# Patient Record
Sex: MALE | Race: WHITE
[De-identification: names, ages, dates, MRNs, and addresses within clinical notes are randomized per-mention and may not be internally consistent; named-entity substitution may affect disease eponyms.]

---

## 2019-12-04 ENCOUNTER — HOSPITAL ENCOUNTER (EMERGENCY)
Dept: HOSPITAL 62 - ER | Age: 29
Discharge: HOME | End: 2019-12-04
Payer: MEDICAID

## 2019-12-04 VITALS — DIASTOLIC BLOOD PRESSURE: 81 MMHG | SYSTOLIC BLOOD PRESSURE: 115 MMHG

## 2019-12-04 DIAGNOSIS — F12.10: ICD-10-CM

## 2019-12-04 DIAGNOSIS — J45.909: ICD-10-CM

## 2019-12-04 DIAGNOSIS — K40.90: Primary | ICD-10-CM

## 2019-12-04 DIAGNOSIS — R31.9: ICD-10-CM

## 2019-12-04 LAB
APPEARANCE UR: CLEAR
APTT PPP: YELLOW S
BILIRUB UR QL STRIP: NEGATIVE
GLUCOSE UR STRIP-MCNC: NEGATIVE MG/DL
KETONES UR STRIP-MCNC: NEGATIVE MG/DL
NITRITE UR QL STRIP: NEGATIVE
PH UR STRIP: 5 [PH] (ref 5–9)
PROT UR STRIP-MCNC: NEGATIVE MG/DL
SP GR UR STRIP: 1.03
UROBILINOGEN UR-MCNC: NEGATIVE MG/DL (ref ?–2)

## 2019-12-04 PROCEDURE — 81001 URINALYSIS AUTO W/SCOPE: CPT

## 2019-12-04 PROCEDURE — 76870 US EXAM SCROTUM: CPT

## 2019-12-04 PROCEDURE — 93976 VASCULAR STUDY: CPT

## 2019-12-04 PROCEDURE — 99284 EMERGENCY DEPT VISIT MOD MDM: CPT

## 2019-12-04 NOTE — ER DOCUMENT REPORT
HPI





- HPI


Patient complains to provider of: inguinal hernia


Time Seen by Provider: 12/04/19 14:25


Onset: Other - 10 yearss


Quality of pain: Achy


Pain Level: 5


Context: 





This 29-year-old male presents emergency department with right-sided inguinal 

hernia for the past 10 years that has increased.  Reports he has been unable to 

work for the last 4 days due to this.


Denies pain with void.


Associated Symptoms: None


Exacerbated by: Other


Relieved by: Denies


Similar symptoms previously: No


Recently seen / treated by doctor: No





Past Medical History





- General


Information source: Patient





- Social History


Smoking Status: Never Smoker


Chew tobacco use (# tins/day): No


Frequency of alcohol use: Rare


Drug Abuse: Marijuana


Family History: Reviewed & Not Pertinent


Patient has suicidal ideation: No


Patient has homicidal ideation: No


Pulmonary Medical History: Reports: Hx Asthma





- Immunizations


Immunizations up to date: Yes


Hx Diphtheria, Pertussis, Tetanus Vaccination: Yes - 2011





Vertical Provider Document





- CONSTITUTIONAL


Agree With Documented VS: Yes


Exam Limitations: No Limitations


General Appearance: WD/WN, No Apparent Distress





- INFECTION CONTROL


TRAVEL OUTSIDE OF THE [place] IN LAST 30 DAYS: No





- HEENT


HEENT: Atraumatic, Normocephalic





- NECK


Neck: Normal Inspection, Supple.  negative: Lymphadenopathy-Left, 

Lymphadenopathy-Right





- RESPIRATORY


Respiratory: Breath Sounds Normal, No Respiratory Distress





- CARDIOVASCULAR


Cardiovascular: Regular Rate





- GI/ABDOMEN


Gastrointestinal: Abdomen Soft, Abdomen Non-Tender





- REPRODUCTIVE


Male Genitalia: Normal Inspection


Notes: 





soft bulge to right inguinal area, no firmness, reduced with pressure





- MUSCULOSKELETAL/EXTREMETIES


Musculoskeletal/Extremeties: MAEW, FROM





- NEURO


Level of Consciousness: Awake, Alert, Appropriate





- DERM


Integumentary: Warm, Dry





Course





- Re-evaluation


Re-evalutation: 





12/04/19 16:40


This 29-year-old male presents with complaints of inguinal hernia for the past 

10 years increasing.  Reports he lost his job because he cannot work because 

every time he lifts something it hurts.  Ultrasound report notes right inguinal 

hernia area soft very tender to touch no erythema no warmth.  Dr. Cervantes surgeon

called he is in the OR will call me back.


12/04/19 17:10


Dr. Cervantes called back.  Discussed ultrasound report and patient's symptoms.  He

reports patient may follow-up with him in the office.  Patient was instructed 

this.











Dictation of this chart was performed using voice recognition software; 

therefore, there may be some unintended grammatical errors.


                                        











Urine Color  YELLOW   12/04/19  14:36    


 


Urine Appearance  CLEAR   12/04/19  14:36    


 


Urine pH  5.0  (5.0-9.0)   12/04/19  14:36    


 


Ur Specific Gravity  1.027   12/04/19  14:36    


 


Urine Protein  NEGATIVE mg/dL (NEGATIVE)   12/04/19  14:36    


 


Urine Glucose (UA)  NEGATIVE mg/dL (NEGATIVE)   12/04/19  14:36    


 


Urine Ketones  NEGATIVE mg/dL (NEGATIVE)   12/04/19  14:36    


 


Urine Blood  MODERATE  (NEGATIVE)  H  12/04/19  14:36    


 


Urine Nitrite  NEGATIVE  (NEGATIVE)   12/04/19  14:36    


 


Ur Leukocyte Esterase  NEGATIVE  (NEGATIVE)   12/04/19  14:36    


 


Urine WBC (Auto)  1 /HPF  12/04/19  14:36    


 


Urine RBC (Auto)  3 /HPF  12/04/19  14:36    








                                        





Scrotum Ultrasound  12/04/19 14:31


IMPRESSION:  1.  NO EVIDENCE OF TESTICULAR MASS OR TORSION.


2.  Right inguinal hernia is suggested.


 











12/04/19 19:21


Dictation of this chart was performed using voice recognition software; 

therefore, there may be some unintended grammatical errors.





- Vital Signs


Vital signs: 


                                        











Temp Pulse Resp BP Pulse Ox


 


 98.0 F   81   20   123/80   98 


 


 12/04/19 14:25  12/04/19 14:25  12/04/19 14:25  12/04/19 14:25  12/04/19 14:25














- Laboratory


Laboratory results interpreted by me: 


                                        











  12/04/19





  14:36


 


Urine Blood  MODERATE H














- Diagnostic Test


Radiology reviewed: Reports reviewed





Discharge





- Discharge


Clinical Impression: 


 Right inguinal hernia





Hematuria


Qualifiers:


 Hematuria type: unspecified type Qualified Code(s): R31.9 - Hematuria, 

unspecified





Condition: Stable


Disposition: HOME, SELF-CARE


Instructions:  Hematuria ([facility]), Hernia ([facility])


Additional Instructions: 


*You have been evaluated for inguinal hernia, hematuria


*take ibuprofen as indicated


*Follow up with Dr Cervantes, call for an appointment tomorrow


*Return to ED for worsening condition, changes, needs


Referrals: 


EMILY ALANIZ MD [Primary Care Provider] - Follow up as needed


TONEY CERVANTES MD [ACTIVE STAFF] - Follow up tomorrow (call for an 

appointment)

## 2019-12-04 NOTE — ER DOCUMENT REPORT
ED Medical Screen (RME)





- General


Chief Complaint: Abdominal Pain


Stated Complaint: GROIN PAIN


Time Seen by Provider: 12/04/19 14:25


Mode of Arrival: Ambulatory


Information source: Patient


Notes: 





This 29-year-old male presents emergency department with right-sided inguinal 

hernia for the past 10 years that has increased.  Reports he has been unable to 

work for the last 4 days due to this.


Denies pain with void.











I have greeted and performed a rapid initial assessment of this patient.  A 

comprehensive ED assessment and evaluation of the patient, analysis of test 

results and completion of the medical decision making process will be conducted 

by additional ED providers.  Dictation of this chart was performed using voice 

recognition software; therefore, there may be some unintended grammatical 

errors.


TRAVEL OUTSIDE OF THE [place] IN LAST 30 DAYS: No





- Related Data


Allergies/Adverse Reactions: 


                                        





ibuprofen [From Motrin] Allergy (Verified 06/03/16 09:07)


   











Past Medical History





- Social History


Chew tobacco use (# tins/day): No


Frequency of alcohol use: Rare


Drug Abuse: Marijuana


Pulmonary Medical History: Reports: Hx Asthma





- Immunizations


Immunizations up to date: Yes


Hx Diphtheria, Pertussis, Tetanus Vaccination: Yes - 2011





Physical Exam





- Vital signs


Vitals: 





                                        











Temp Pulse Resp BP Pulse Ox


 


 98.0 F   81   20   123/80   98 


 


 12/04/19 14:10  12/04/19 14:10  12/04/19 14:10  12/04/19 14:10  12/04/19 14:10














Course





- Vital Signs


Vital signs: 





                                        











Temp Pulse Resp BP Pulse Ox


 


 98.0 F   81   20   123/80   98 


 


 12/04/19 14:25  12/04/19 14:25  12/04/19 14:25  12/04/19 14:25  12/04/19 14:25

## 2019-12-04 NOTE — RADIOLOGY REPORT (SQ)
EXAM DESCRIPTION:  U/S SCROTUM W/DOPPLER



COMPLETED DATE/TIME:  12/4/2019 3:28 pm



REASON FOR STUDY:  inguinal hernia and testicular pain



COMPARISON:  None.



TECHNIQUE:  Static and realtime gray scale imaging of the scrotum and testes.  Selected color Doppler
 and spectral images recorded to document blood flow.



LIMITATIONS:  None.



FINDINGS:  RIGHT:

TESTICLE:  The right testicle measures 5.1 x 4.0 x 3.5 cm, normal size. Normal echotexture.  Normal b
lood flow.  No mass.

EPIDIDYMIS:  The head of the epididymis measures 7 x 11 x 8 mm.  Normal.

HYDROCELE OR VARICOCELE: No.

HERNIA OR EXTRA-TESTICULAR MASS: No.

OTHER:  An inguinal hernia is suggested which extends into the right scrotal sac measures 8.2 x 7.0 x
 4.5 cm and contains fat.  No other significant finding.

LEFT:

TESTICLE:  The left testicle measures 6.0 x 3.6 x 3.2 cm, normal size. Normal echotexture.  Normal bl
ood flow.  No mass.

EPIDIDYMIS:  The head of the epididymis measures 8 x 9 x 13 mm.

HYDROCELE OR VARICOCELE: No.

HERNIA OR EXTRA-TESTICULAR MASS: No.

OTHER: No other significant finding.



IMPRESSION:  1.  NO EVIDENCE OF TESTICULAR MASS OR TORSION.

2.  Right inguinal hernia is suggested.



TECHNICAL DOCUMENTATION:  JOB ID:  [id number]

 [employer]- All Rights Reserved



Reading location - IP/workstation name: [email or web address]

## 2019-12-05 ENCOUNTER — HOSPITAL ENCOUNTER (OUTPATIENT)
Dept: HOSPITAL 62 - OD | Age: 29
End: 2019-12-05
Attending: SURGERY
Payer: MEDICAID

## 2019-12-05 DIAGNOSIS — Z01.818: Primary | ICD-10-CM

## 2019-12-05 DIAGNOSIS — K40.20: ICD-10-CM

## 2019-12-05 LAB
ANION GAP SERPL CALC-SCNC: 11 MMOL/L (ref 5–19)
BUN SERPL-MCNC: 16 MG/DL (ref 7–20)
CALCIUM: 9.9 MG/DL (ref 8.4–10.2)
CHLORIDE SERPL-SCNC: 107 MMOL/L (ref 98–107)
CO2 SERPL-SCNC: 24 MMOL/L (ref 22–30)
GLUCOSE SERPL-MCNC: 81 MG/DL (ref 75–110)
POTASSIUM SERPL-SCNC: 4.3 MMOL/L (ref 3.6–5)

## 2019-12-05 PROCEDURE — 80048 BASIC METABOLIC PNL TOTAL CA: CPT

## 2019-12-05 PROCEDURE — 36415 COLL VENOUS BLD VENIPUNCTURE: CPT

## 2019-12-09 ENCOUNTER — HOSPITAL ENCOUNTER (OUTPATIENT)
Dept: HOSPITAL 62 - OROUT | Age: 29
Discharge: HOME | End: 2019-12-09
Attending: SURGERY
Payer: MEDICAID

## 2019-12-09 VITALS — DIASTOLIC BLOOD PRESSURE: 76 MMHG | SYSTOLIC BLOOD PRESSURE: 112 MMHG

## 2019-12-09 DIAGNOSIS — K40.30: Primary | ICD-10-CM

## 2019-12-09 DIAGNOSIS — K40.90: ICD-10-CM

## 2019-12-09 LAB
ANION GAP SERPL CALC-SCNC: 10 MMOL/L (ref 5–19)
BUN SERPL-MCNC: 14 MG/DL (ref 7–20)
CALCIUM: 9 MG/DL (ref 8.4–10.2)
CHLORIDE SERPL-SCNC: 106 MMOL/L (ref 98–107)
CO2 SERPL-SCNC: 24 MMOL/L (ref 22–30)
GLUCOSE SERPL-MCNC: 84 MG/DL (ref 75–110)
POTASSIUM SERPL-SCNC: 4.2 MMOL/L (ref 3.6–5)

## 2019-12-09 PROCEDURE — 86901 BLOOD TYPING SEROLOGIC RH(D): CPT

## 2019-12-09 PROCEDURE — 49650 LAP ING HERNIA REPAIR INIT: CPT

## 2019-12-09 PROCEDURE — 86900 BLOOD TYPING SEROLOGIC ABO: CPT

## 2019-12-09 PROCEDURE — 36415 COLL VENOUS BLD VENIPUNCTURE: CPT

## 2019-12-09 PROCEDURE — 86850 RBC ANTIBODY SCREEN: CPT

## 2019-12-09 PROCEDURE — 80048 BASIC METABOLIC PNL TOTAL CA: CPT

## 2019-12-09 PROCEDURE — C1758 CATHETER, URETERAL: HCPCS

## 2019-12-09 PROCEDURE — C1781 MESH (IMPLANTABLE): HCPCS

## 2019-12-10 NOTE — DISCHARGE SUMMARY
Discharge Summary (SDC)





- Discharge


Final Diagnosis: 





Bilateral inguinal hernias, right is incarcerated, left is reducible.


Date of Surgery: 12/09/19


Discharge Date: 12/09/19


Condition: Stable


Forms:  [facility] Anesthesia D/C Instruction, Discharge POC-Surgical Service


Treatment or Instructions: 


Bathing: You can shower in 48 hours. Remove your bandage before you shower. It 

is normal to see a small amount of blood under the bandage. Carefully wash 

around your wound. It is okay to let soap and water run over your wound. Do not 

scrub your wound. Gently pay your wound dry. 


Care for your wound as directed: If you have strips of medical tape over your 

incision, allow them to fall off on their own. It may take 7 to 10 days for them

to fall off. Do not put powders, lotions, or creams on your wound. They may 

cause your wound to get infected. Do not get in a bathtub, swimming pool, or hot

tub until your healthcare provider says it is okay. Check your wound every day 

for signs of infection, such as redness, swelling, or pus. Bruising is normal 

and expected. Men may have bruising and swelling in the scrotum. 


Take deep breaths and cough: Do this 10 times each hour. This will decrease your

risk for a lung infection. Take a deep breath and hold it for as long as you 

can. Let the air out and then cough strongly. Deep breaths help open your 

airway. You may be given an incentive spirometer to help you take deep breaths. 

Put the plastic piece in your mouth and take a slow, deep breath. Then let the 

air out and cough. Repeat these steps 10 times every hour. 


Use a pillow as a splint: Press a pillow lightly against your incision when you 

cough, move, or get out of bed. This may decrease pain or discomfort. You may 

need another person to help you get in and out of bed, a chair, or off the 

toilet. 


Activity: Get out of bed and walk the day after your surgery. This will help 

prevent blood clots, move your bowels after surgery, and increase healing. Start

with short walks around the house. Gradually walk further each day. Do not play 

sports for 2 to 3 weeks. Do not lift anything heavier than 5 pounds until your 

healthcare provider says it is okay. This may put too much pressure on your 

incision and cause it to come apart. It may also increase your risk for another 

hernia. 


Drink liquids as directed: Liquids may prevent constipation and straining during

a bowel movement. This will help prevent pressure on your incision, and prevent 

another hernia. Ask how much liquid to drink each day and which liquids are best

for you. 


Decrease swelling: 


* Apply ice on your incision for 15 to 20 minutes every hour or as directed. Use

  an ice pack, or put crushed ice in a plastic bag. Cover it with a towel. Ice 

  helps prevent tissue damage and decreases swelling and pain.





* Elevate your scrotum on a towel. Lie in bed. Roll a small towel and place it 

  under your scrotum. This will help decrease swelling and bruising. 


Driving: Do not drive for at least 1 week after surgery. Do not drive if you are

taking prescription pain medication. Do not drive until it is comfortable to 

wear a seatbelt across your abdomen. Ask your healthcare provider when it is 

safe for you to drive. 


Return to work or school: You may be able to return to work or school in 48 to 

72 hours. You may need to stay out of work if longer you have to lift heavy 

items at work. 


Do not smoke: Nicotine and other chemicals in cigarettes and cigars can prevent 

your wound from healing. It can also increase your risk for another inguinal 

hernia. Ask your healthcare provider for information if you currently smoke and 

need help to quit. E-cigarettes or smokeless tobacco still contain nicotine. 

Talk to your healthcare provider before you use these products. 


Referrals: 


TONEY RIVERA MD [ACTIVE STAFF] - 12/17/19 3:15 pm


GILDA LOWERY DO [Primary Care Provider] - 


Discharge Diet: As Tolerated


Respiratory Treatments at Home: Deep Breathing/Coughing, Incentive Spirometer


Discharge Activity: No Lifting Over 10 Pounds, No Lifting/Push/Pulling


Home Care Assistance: None Needed


Report the Following to Your Physician Immediately: Shortness of Breath, Nausea,

Vomiting, Increase in Pain, Fever over 101 Degrees, Unusual Bleeding, Redness, 

Warmth, Drainage-Yellow, Drainage-Green

## 2019-12-10 NOTE — OPERATIVE REPORT
Nonrecallable Operative Report


DATE OF SURGERY: 12/09/19


PREOPERATIVE DIAGNOSIS: Bilateral, symptomatic inguinal hernias.  Right is 

incarcerated, left is reducible.


POSTOPERATIVE DIAGNOSIS: Same as above


OPERATION: Bilateral robot-assisted laparoscopic inguinal hernia repair with 

mesh.


SURGEON: TONEY RIVERA


ANESTHESIA: GA


TISSUE REMOVED OR ALTERED: None


COMPLICATIONS: 





None apparent


ESTIMATED BLOOD LOSS: Minimal


PROCEDURE: 





Drains/implants: Right and left large 3 DMax inguinal hernia mesh.





Procedure in detail: After informed consent was obtained, the patient was 

brought into the operating room and laid in the supine position.  The area of 

the abdomen was prepped and draped in a normal sterile fashion.  A calderón

praumbilical incision was created with a 15 blade scalpel.  Dissection was 

carried down to the fascia using sharp and blunt dissection.  The linea alba 

fascia was incised sharply, the abdomen was entered sharply.  The balloon trocar

was inserted, and pneumoperitoneum was achieved.





2 right and left lateral 8 mm robotic trochars were then placed under direct 

laparoscopic visualization.  The robot was brought over the patient and docked 

appropriately.  I then assumed my position at the surgeon's console.  Attention 

was turned to the right groin.  There was a large indirect inguinal hernia 

defect with a large amount of omentum incarcerated within it.  With great 

difficulty the omentum was reduced into the abdominal cavity in its entirety.  

Next dissection of the sac was undertaken.  The peritoneum was scored 3 cm 

superior to the defect.  A preperitoneal dissection was undertaken using sharp 

dissection, blunt dissection, and electrocautery.  The hernia sac was then freed

from the cord structures, taking great care not to injure the cord structures.  

Once this was completed, a right-sided 3 DMax inguinal hernia mesh was placed 

into the preperitoneal space.  It was sutured medially and superiorly using 2-0 

Vicryl suture.  After this was completed, the peritoneal defect was closed using

2-0 V lock suture.  Attention was then turned to the left side.





The peritoneum was scored in the left groin, 3 cm superior to the small indirect

inguinal hernia defect.  A preperitoneal dissection was undertaken using sharp 

dissection, blunt dissection, and electrocautery.  The small hernia sac was 

freed from the cord structures, taking great care not to injure the cord 

structures.  There was a lipoma of the cord that was present.  This was reduced.

 Next, a large left-sided 3 DMax inguinal hernia mesh was placed into the 

preperitoneal space.  It was sutured medially and superiorly using 2-0 Vicryl 

suture.  The peritoneum was then closed using 2-0 V lock suture in simple 

running fashion.  Once this was completed, the repairs were inspected, and found

to be in good order.  I scrubbed back into the case.  The robot was then 

undocked, the trochars were removed, and pneumoperitoneum was relieved.





The supraumbilical fascia was closed using 0 Vicryl suture in figure-of-eight 

fashion.  The overlying skin was closed using 4-0 Vicryl Rapide suture in 

subcuticular fashion.  Dressings were placed, and the procedure was concluded.  

All sponge, instrument, and needle counts were correct x2.





Condition: Stable.

## 2020-08-12 ENCOUNTER — HOSPITAL ENCOUNTER (EMERGENCY)
Dept: HOSPITAL 62 - ER | Age: 30
Discharge: HOME | End: 2020-08-12
Payer: MEDICAID

## 2020-08-12 VITALS — SYSTOLIC BLOOD PRESSURE: 124 MMHG | DIASTOLIC BLOOD PRESSURE: 64 MMHG
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
## 2020-08-12 DIAGNOSIS — Z88.8: ICD-10-CM

## 2020-08-12 DIAGNOSIS — J45.909: ICD-10-CM

## 2020-08-12 DIAGNOSIS — N50.811: ICD-10-CM

## 2020-08-12 DIAGNOSIS — R10.31: Primary | ICD-10-CM

## 2020-08-12 LAB
APPEARANCE UR: CLEAR
APTT PPP: YELLOW S
BILIRUB UR QL STRIP: NEGATIVE
CHLAM PCR: NOT DETECTED
GLUCOSE UR STRIP-MCNC: NEGATIVE MG/DL
KETONES UR STRIP-MCNC: NEGATIVE MG/DL
NITRITE UR QL STRIP: NEGATIVE
PH UR STRIP: 6 [PH] (ref 5–9)
PROT UR STRIP-MCNC: NEGATIVE MG/DL
SP GR UR STRIP: 1.02
UROBILINOGEN UR-MCNC: 4 MG/DL (ref ?–2)

## 2020-08-12 PROCEDURE — 87491 CHLMYD TRACH DNA AMP PROBE: CPT

## 2020-08-12 PROCEDURE — 81001 URINALYSIS AUTO W/SCOPE: CPT

## 2020-08-12 PROCEDURE — 76857 US EXAM PELVIC LIMITED: CPT

## 2020-08-12 PROCEDURE — 99284 EMERGENCY DEPT VISIT MOD MDM: CPT

## 2020-08-12 PROCEDURE — 87591 N.GONORRHOEAE DNA AMP PROB: CPT

## 2020-08-12 NOTE — ER DOCUMENT REPORT
ED General





- General


Chief Complaint: Groin Pain


Stated Complaint: GROIN PAIN


Time Seen by Provider: 08/12/20 17:03


Primary Care Provider: 


GILDA LOWERY DO [Primary Care Provider] - Follow up as needed


Mode of Arrival: Ambulatory


TRAVEL OUTSIDE OF THE [place] IN LAST 30 DAYS: No





- HPI


Patient complains to provider of: groin pain


Notes: 





Patient presents with resolved episode of right groin pain right testicle pain 

pain was 10/10 sharp in nature without radiation nothing made it better or 

worse.  Patient says the pain started last night when he was moving some heavy 

equipment at work.  Patient says he been having some dull aching pain prior to 

that but he thinks it got worse after he moved a heavy equipment.  Patient has 

history of inguinal hernia repair in December.  Has been doing well but has some

continued nagging pain in the right side.  The episode last night but much 

worse.  Denies fever chills nausea vomiting other symptoms had normal bowel 

movement yesterday and today.





- Related Data


Allergies/Adverse Reactions: 


                                        





ibuprofen [From Motrin] Allergy (Verified 06/03/16 09:07)


   











Past Medical History





- General


Information source: Patient





- Social History


Smoking Status: Unknown if Ever Smoked


Family History: Reviewed & Not Pertinent





- Past Medical History


Cardiac Medical History: 


   Denies: Hx Coronary Artery Disease, Hx Heart Attack, Hx Hypertension


Pulmonary Medical History: Reports: Hx Asthma


   Denies: Hx Bronchitis, Hx COPD, Hx Pneumonia


Neurological Medical History: Denies: Hx Cerebrovascular Accident, Hx Seizures


Musculoskeletal Medical History: Denies Hx Arthritis





- Immunizations


Immunizations up to date: Yes


Hx Diphtheria, Pertussis, Tetanus Vaccination: Yes - 2011





Review of Systems





- Review of Systems


Notes: 





REVIEW OF SYSTEMS:


CONSTITUTIONAL: -fevers, -chills


EENT: -eye pain, -difficulty swallowing, -nasal congestion


CARDIOVASCULAR: -chest pain, -syncope.


RESPIRATORY: -cough, -SOB


GASTROINTESTINAL: Groin pain, -nausea, -vomiting, -diarrhea


GENITOURINARY: -dysuria, -hematuria


MUSCULOSKELETAL: -back pain, -neck pain


SKIN: -rash or skin lesions.


HEMATOLOGIC: -easy bruising or bleeding.


LYMPHATIC: -swollen, enlarged glands.


NEUROLOGICAL: -altered mental status or loss of consciousness, -headache, -

neurologic symptoms


PSYCHIATRIC: -anxiety, -depression.


ALL OTHER SYSTEMS REVIEWED AND NEGATIVE.





Physical Exam





- Vital signs


Vitals: 


                                        











Temp Pulse Resp BP Pulse Ox


 


 98.6 F   71   18   130/85 H  98 


 


 08/12/20 17:04  08/12/20 17:04  08/12/20 17:04  08/12/20 17:04  08/12/20 17:04














- Notes


Notes: 





PHYSICAL EXAMINATION:


GENERAL: Well-appearing, well-nourished and in no acute distress.


HEAD: Atraumatic, normocephalic.


EYES: Pupils equal round, sclera anicteric, conjunctiva are normal.


ENT: Surgical mask in place.


NECK: Normal range of motion, 


LUNGS: No respiratory Distress, normal chest rise


EXTREMITIES: Normal range of motion, No cyanosis.


NEUROLOGICAL: Cranial nerves grossly intact.  Normal speech,


PSYCH: Normal mood, normal affect.


SKIN: Warm, Dry,





Course





- Re-evaluation


Re-evalutation: 





08/12/20 18:32


Appearing man in no acute distress benign physical exam stable vitals within n

ormal limits no need for analgesia





Vital signs today within normal limits





Urine shows blood of unknown etiology but no infection.  Patient could have 

possibly passed a kidney stone see no longer is any symptoms





Ultrasound groin and scrotum show no hernia or other gross abnormality.  Patient

be discharged home with prescription for oral opioids





Recommend follow-up at his PCP and surgeon.  Given strict return precautions





- Vital Signs


Vital signs: 


                                        











Temp Pulse Resp BP Pulse Ox


 


 98.6 F   71   18   130/85 H  98 


 


 08/12/20 17:04  08/12/20 17:04  08/12/20 17:04  08/12/20 17:04  08/12/20 17:04














- Laboratory


Laboratory results interpreted by me: 


                                        











  08/12/20





  17:15


 


Urine Blood  SMALL H


 


Urine Urobilinogen  4.0 H














Discharge





- Discharge


Clinical Impression: 


Groin pain


Qualifiers:


 Laterality: right Qualified Code(s): R10.31 - Right lower quadrant pain





Condition: Stable


Disposition: HOME, SELF-CARE


Instructions:  Abdominal Pain ([facility])


Prescriptions: 


Oxycodone HCl [Oxycontin Ir 5 Mg Tablet] 5 mg PO Q4H PRN #15 tablet


 PRN Reason: For Pain


Referrals: 


GILDA LOWERY DO [Primary Care Provider] - Follow up as needed


TONEY RIVERA MD [ACTIVE STAFF] - Follow up as needed

## 2020-08-12 NOTE — ER DOCUMENT REPORT
ED Medical Screen (RME)





- General


Chief Complaint: Groin Pain


Stated Complaint: GROIN PAIN


Time Seen by Provider: 08/12/20 17:03


Primary Care Provider: 


GILDA LOWERY DO [Primary Care Provider] - Follow up as needed


Mode of Arrival: Ambulatory


Information source: Patient


Notes: 





29-year-old male presented to ED for complaint of right groin pain.  He states 

that 6 months ago he had bilateral inguinal hernia repairs.  He states 2 to 3 

weeks ago he started having pain in his right groin area.  He states he does 

move about a hip heavy equipment around during his job.  He states he thought 

maybe he was overdoing it and it was okay except for last night at work on third

shift he was moving equipment and he had excruciating pain and had to go home.  

He states he came in today to see if he had injured himself.  He states he does 

not smoke drinks maybe once a month and no illicit drugs.  I have ordered clean 

and dirty urine and a pelvic ultrasound.




















I have greeted and performed a rapid initial assessment of this patient.  A 

comprehensive ED assessment and evaluation of the patient, analysis of test 

results and completion of medical decision making process will be conducted by 

an additional ED providers.


TRAVEL OUTSIDE OF THE [place] IN LAST 30 DAYS: No





- Related Data


Allergies/Adverse Reactions: 


                                        





ibuprofen [From Motrin] Allergy (Verified 06/03/16 09:07)


   











Past Medical History





- Past Medical History


Cardiac Medical History: 


   Denies: Hx Coronary Artery Disease, Hx Heart Attack, Hx Hypertension


Pulmonary Medical History: Reports: Hx Asthma


   Denies: Hx Bronchitis, Hx COPD, Hx Pneumonia


Neurological Medical History: Denies: Hx Cerebrovascular Accident, Hx Seizures


Musculoskeltal Medical History: Denies Hx Arthritis





- Immunizations


Immunizations up to date: Yes


Hx Diphtheria, Pertussis, Tetanus Vaccination: Yes - 2011





Physical Exam





- Vital signs


Vitals: 





                                        











Temp Pulse Resp BP Pulse Ox


 


 98.6 F   71   18   130/85 H  98 


 


 08/12/20 17:04 08/12/20 17:04 08/12/20 17:04 08/12/20 17:04 08/12/20 17:04














Course





- Vital Signs


Vital signs: 





                                        











Temp Pulse Resp BP Pulse Ox


 


 98.6 F   71   18   130/85 H  98 


 


 08/12/20 17:04  08/12/20 17:04  08/12/20 17:04  08/12/20 17:04  08/12/20 17:04














Doctor's Discharge





- Discharge


Referrals: 


GILDA LOWERY,  [Primary Care Provider] - Follow up as needed